# Patient Record
Sex: FEMALE | Race: WHITE | NOT HISPANIC OR LATINO | Employment: UNEMPLOYED | URBAN - METROPOLITAN AREA
[De-identification: names, ages, dates, MRNs, and addresses within clinical notes are randomized per-mention and may not be internally consistent; named-entity substitution may affect disease eponyms.]

---

## 2021-07-22 ENCOUNTER — VBI (OUTPATIENT)
Dept: ADMINISTRATIVE | Facility: OTHER | Age: 56
End: 2021-07-22

## 2021-07-22 NOTE — TELEPHONE ENCOUNTER
Upon review of the In Basket request we were able to locate, review, and update the patient chart as requested for Mammogram and Pap Smear (HPV) aka Cervical Cancer Screening  Any additional questions or concerns should be emailed to the Practice Liaisons via [email or web address] email, please do not reply via In Basket      Thank you  Ellis Barbour

## 2021-09-03 RX ORDER — GARLIC 180 MG
1 TABLET, DELAYED RELEASE (ENTERIC COATED) ORAL DAILY
COMMUNITY
End: 2021-09-07 | Stop reason: ALTCHOICE

## 2021-09-03 RX ORDER — EVENING PRIMROSE OIL 500 MG
1 CAPSULE ORAL DAILY
COMMUNITY
End: 2021-09-07 | Stop reason: ALTCHOICE

## 2021-09-06 PROBLEM — Z80.3 FAMILY HISTORY OF MALIGNANT NEOPLASM OF BREAST: Status: ACTIVE | Noted: 2021-09-06

## 2021-09-07 ENCOUNTER — ANNUAL EXAM (OUTPATIENT)
Dept: OBGYN CLINIC | Facility: CLINIC | Age: 56
End: 2021-09-07
Payer: COMMERCIAL

## 2021-09-07 VITALS
DIASTOLIC BLOOD PRESSURE: 58 MMHG | HEIGHT: 64 IN | WEIGHT: 133.6 LBS | BODY MASS INDEX: 22.81 KG/M2 | SYSTOLIC BLOOD PRESSURE: 100 MMHG

## 2021-09-07 DIAGNOSIS — Z01.419 ENCOUNTER FOR ANNUAL ROUTINE GYNECOLOGICAL EXAMINATION: Primary | ICD-10-CM

## 2021-09-07 DIAGNOSIS — Z12.31 ENCOUNTER FOR SCREENING MAMMOGRAM FOR MALIGNANT NEOPLASM OF BREAST: ICD-10-CM

## 2021-09-07 DIAGNOSIS — Z80.3 FAMILY HISTORY OF BREAST CANCER: ICD-10-CM

## 2021-09-07 PROCEDURE — 99396 PREV VISIT EST AGE 40-64: CPT | Performed by: OBSTETRICS & GYNECOLOGY

## 2021-09-07 NOTE — ASSESSMENT & PLAN NOTE
All well, no complaints  Normal breast and pelvic exams  Pap normal last year  Mammo order given  Encouraged derm evaluation of eczema and lubricants for vaginal dryness

## 2021-09-07 NOTE — PROGRESS NOTES
Assessment/Plan:    Encounter for annual routine gynecological examination  All well, no complaints  Normal breast and pelvic exams  Pap normal last year  Mammo order given  Encouraged derm evaluation of eczema and lubricants for vaginal dryness  Diagnoses and all orders for this visit:    Encounter for annual routine gynecological examination    Encounter for screening mammogram for malignant neoplasm of breast  -     Mammo screening bilateral w 3d & cad; Future    Family history of breast cancer  -     Mammo screening bilateral w 3d & cad; Future    Other orders  -     Discontinue: Evening Primrose Oil 500 MG CAPS; Take 1 capsule by mouth daily (Patient not taking: Reported on 2021)  -     Discontinue: Black Cohosh 40 MG CAPS; Take 1 capsule by mouth daily (Patient not taking: Reported on 2021)  -     Multiple Vitamins-Minerals (DAILY MULTI PO); Take 1 tablet by mouth daily          Subjective:      Patient ID: Lara Lomeli is a 61 y o  female  Here for well check  The following portions of the patient's history were reviewed and updated as appropriate:   She  has a past medical history of Colon cancer screening - with Cologard (),  3 para 3, and Papanicolaou smear (2020)  She   Patient Active Problem List    Diagnosis Date Noted    Encounter for annual routine gynecological examination 2021    Family history of malignant neoplasm of breast - Maternal aunt 2021     She  has a past surgical history that includes Mammo (historical) (Bilateral, 2020); Colonoscopy (); and Cervical polypectomy ()  Her family history includes Breast cancer in her maternal aunt  She  reports that she has never smoked  She has never used smokeless tobacco  She reports current alcohol use  She reports that she does not use drugs    Current Outpatient Medications   Medication Sig Dispense Refill    Multiple Vitamins-Minerals (DAILY MULTI PO) Take 1 tablet by mouth daily       No current facility-administered medications for this visit  She is allergic to acetaminophen, doxycycline, fish-derived products - food allergy, and sulfa antibiotics       Review of Systems  No PMB, breast, bladder, bowel changes   No new persistent pain, bloating, early satiety or pelvic pressure    Objective:      /58   Ht 5' 3 75" (1 619 m)   Wt 60 6 kg (133 lb 9 6 oz)   Breastfeeding No   BMI 23 11 kg/m²          Physical Exam  General appearance: no distress, pleasant,anxious  Neck: thyroid without nodules or thyromegaly, no palpable adenopathy  Lymph nodes: no palpable adenopathy  Breasts: no masses, nodes or skin changes  Abdomen: soft, non tender, no palpable masses  Pelvic exam: normal atrophic external genitalia, tiny right hemangioma on clitoral amaya, urethral meatus normal, vagina atrophic without lesions, cervix atrophic without lesions, uterus small, non tender, no adnexal masses, non tender  Rectal exam: normal sphincter tone, no masses, RV confirms above

## 2021-09-07 NOTE — LETTER
2021     [address]  [facility]    Patient: Annelise Rogers   YOB: 1965   Date of Visit: 2021       Dear Dr Teofilo Ortega: Thank you for referring Jorge Pagan to me for evaluation  Below are my notes for this consultation  If you have questions, please do not hesitate to call me  I look forward to following your patient along with you  Sincerely,        Marj Gutierrez MD        CC: No Recipients  Marj Gutierrez MD  2021  1:45 PM  Sign when Signing Visit  Assessment/Plan:    Encounter for annual routine gynecological examination  All well, no complaints  Normal breast and pelvic exams  Pap normal last year  Mammo order given  Encouraged derm evaluation of eczema and lubricants for vaginal dryness  Diagnoses and all orders for this visit:    Encounter for annual routine gynecological examination    Encounter for screening mammogram for malignant neoplasm of breast  -     Mammo screening bilateral w 3d & cad; Future    Family history of breast cancer  -     Mammo screening bilateral w 3d & cad; Future    Other orders  -     Discontinue: Evening Primrose Oil 500 MG CAPS; Take 1 capsule by mouth daily (Patient not taking: Reported on 2021)  -     Discontinue: Black Cohosh 40 MG CAPS; Take 1 capsule by mouth daily (Patient not taking: Reported on 2021)  -     Multiple Vitamins-Minerals (DAILY MULTI PO); Take 1 tablet by mouth daily          Subjective:      Patient ID: Annelise Rogers is a 61 y o  female  Here for well check  The following portions of the patient's history were reviewed and updated as appropriate:   She  has a past medical history of Colon cancer screening - with Cologard (),  3 para 3, and Papanicolaou smear (2020)    She   Patient Active Problem List    Diagnosis Date Noted    Encounter for annual routine gynecological examination 2021    Family history of malignant neoplasm of breast - Maternal aunt 09/06/2021     She  has a past surgical history that includes Mammo (historical) (Bilateral, 09/23/2020); Colonoscopy (2009); and Cervical polypectomy (2017)  Her family history includes Breast cancer in her maternal aunt  She  reports that she has never smoked  She has never used smokeless tobacco  She reports current alcohol use  She reports that she does not use drugs  Current Outpatient Medications   Medication Sig Dispense Refill    Multiple Vitamins-Minerals (DAILY MULTI PO) Take 1 tablet by mouth daily       No current facility-administered medications for this visit  She is allergic to acetaminophen, doxycycline, fish-derived products - food allergy, and sulfa antibiotics       Review of Systems  No PMB, breast, bladder, bowel changes   No new persistent pain, bloating, early satiety or pelvic pressure    Objective:      /58   Ht 5' 3 75" (1 619 m)   Wt 60 6 kg (133 lb 9 6 oz)   Breastfeeding No   BMI 23 11 kg/m²          Physical Exam  General appearance: no distress, pleasant,anxious  Neck: thyroid without nodules or thyromegaly, no palpable adenopathy  Lymph nodes: no palpable adenopathy  Breasts: no masses, nodes or skin changes  Abdomen: soft, non tender, no palpable masses  Pelvic exam: normal atrophic external genitalia, tiny right hemangioma on clitoral amaya, urethral meatus normal, vagina atrophic without lesions, cervix atrophic without lesions, uterus small, non tender, no adnexal masses, non tender  Rectal exam: normal sphincter tone, no masses, RV confirms above

## 2021-09-07 NOTE — PATIENT INSTRUCTIONS
Return to office in one year unless having any problems such as bleeding, new persistent pain, new progressive bloating, new problems eating (getting full to quickly) or new constant urinary pressure that does not resolve in one week      Lubricants to consider: Suzi Replens, coconut oil    Dermatologist: Dr Oj Carrillo Phone: [phone]

## 2021-09-14 ENCOUNTER — TELEPHONE (OUTPATIENT)
Dept: OBGYN CLINIC | Facility: CLINIC | Age: 56
End: 2021-09-14

## 2021-09-14 NOTE — TELEPHONE ENCOUNTER
Patient called stating she wanted to know if Dr Lo Pantoja uses something different during her [facility] last week 09/07/21 in [facility]  She develop a rash on her rear lower back and also "finger marks" that is red in the front  She denies fever, denies difficulty breathing, denies fluid lesions just itchy rash  Pt asked if we used new table paper or anything else can cause a rash/allergic reaction  Advised that nothing new is use, we use regular lubricants for exam, latex free gloves, same gown and table paper  She states she did not use coconut oil, replensh, or astroglide for lubricants (vaginal dryness) as recommended per Dr Lo Pantoja  Also she asked if it is a "[facility] thing in regards to rectal exam" as she was surprised that Dr Lo Pantoja did a rectal exam and she never had that done before  Advised pt that the providers usually asked patient first if okay to perform the rectal exam to check for problems such as an abnormal mass in the rectum or the anus, ovarian cancer, or uterine cancer typically age of 48 and older  Pt request a call back if regards to her rash in which what may be causing it  Dr Lo Pantoja please review and address

## 2021-09-15 NOTE — TELEPHONE ENCOUNTER
Attempt to call patient  Left a detailed message on pt's vm of Dr Baugh Shall message as written   Advised her to call back if she interested in the appointment for further eval

## 2021-09-15 NOTE — TELEPHONE ENCOUNTER
Rash on the back could be due to the detergent used on the gown if pt has sensitive skin  I would be happy to see her tomorrow @8AM in [facility] to look at it  For any contact rash benadryl for itching and hydrocortisone cream topically be be of use  A rectal exam is indicated after 40 to assess the space behind the uterus where ovarian problems (cysts, tumors) sit  I do inform a pt prior to that part of the exam and allow the to decline    Thanks

## 2021-09-17 NOTE — TELEPHONE ENCOUNTER
Pt called with follow up questions regarding the rash she developed to her lower abdomen and lower back post her exam  Pt questioned if the gowns are the same color used in prior exams, any change in table paper, what hand does provider place on the outside of her abdomen when doing her exam  Advised pt we use latex free gloves, same gowns and table paper  Reviewed, Dr Ale Abrams had offered to see her yesterday for an exam  Pt declined informing she was not comfortable coming back and risking developing another rash  Pt would like alernaitve   Measures such as a  (paper gown) to be used with her next [facility] appointment and will discuss when she calls to schedule  Pt denies having a rash to her perineum post exam    This nurse offered multiple times to assist with scheduling a follow up with Dr Ale Abrams, to further evaluate, pt declined, recommended a follow up with her PCP

## 2022-09-06 NOTE — PROGRESS NOTES
Assessment/Plan:    Encounter for annual routine gynecological examination  Here for well check  Had rash after last year's exam had questioned detergents used in gowns  Not used today  Normal breast exam, mammo order given, last 2020  Pelvic exam with right clitoral amaya hemangioma  Recommend topical estrogen for 6 weeks, recheck with possible biopsy if unresolved  Last pap 2020 neg/HPV neg  Cologuard , due next year  Dexa @64       Diagnoses and all orders for this visit:    Encounter for screening mammogram for malignant neoplasm of breast  -     Mammo screening bilateral w 3d & cad; Future    Encounter for annual routine gynecological examination    Vaginal atrophy  -     estradiol (ESTRACE VAGINAL) 0 1 mg/g vaginal cream; Insert 1 g into the vagina 2 (two) times a week Small external application 2-3 times per week          Subjective:      Patient ID: Bobo Brooks is a 61 y o  female  Here for well check  The following portions of the patient's history were reviewed and updated as appropriate:   She  has a past medical history of Abnormal Pap smear of cervix, Colon cancer screening - with Cologard (),  3 para 3, Kidney stone, Migraine, Papanicolaou smear (2020), and Varicella  She   Patient Active Problem List    Diagnosis Date Noted    Encounter for annual routine gynecological examination 2021    Family history of malignant neoplasm of breast - Maternal aunt 2021     She  has a past surgical history that includes Mammo (historical) (Bilateral, 2020); Colonoscopy (); Cervical polypectomy (); and Gynecologic cryosurgery  Her family history includes Breast cancer in her maternal aunt  She  reports that she has never smoked  She has never used smokeless tobacco  She reports current alcohol use of about 1 0 standard drink of alcohol per week  She reports that she does not use drugs    Current Outpatient Medications   Medication Sig Dispense Refill    [START ON 9/12/2022] estradiol (ESTRACE VAGINAL) 0 1 mg/g vaginal cream Insert 1 g into the vagina 2 (two) times a week Small external application 2-3 times per week 42 5 g 1    Multiple Vitamins-Minerals (DAILY MULTI PO) Take 1 tablet by mouth daily       No current facility-administered medications for this visit  She is allergic to acetaminophen, aspirin, contrast [iodinated diagnostic agents], dextromethorphan, doxycycline, erythromycin, fish-derived products - food allergy, sulfa antibiotics, and tussin [guaifenesin]       Review of Systems  No PMB, breast, bladder, bowel changes   No new persistent pain, bloating, early satiety or pelvic pressure      Objective:      /65 (BP Location: Left arm, Patient Position: Sitting, Cuff Size: Standard)   Ht 5' 4 5" (1 638 m)   Wt 63 1 kg (139 lb 3 2 oz)   BMI 23 52 kg/m²     Offered and declined chaperone, agreed to full exam including rectal       Physical Exam    General appearance: no distress, pleasant,anxious  Neck: thyroid without nodules or thyromegaly, no palpable adenopathy  Lymph nodes: no palpable adenopathy  Breasts: no masses, nodes or skin changes  Abdomen: soft, non tender, no palpable masses  Pelvic exam: normal atrophic external genitalia, tiny right hemangioma on clitoral amaya, urethral meatus normal, vagina atrophic without lesions, cervix atrophic without lesions, uterus small, non tender, no adnexal masses, non tender  Rectal exam: normal sphincter tone, no masses, RV confirms above

## 2022-09-09 ENCOUNTER — ANNUAL EXAM (OUTPATIENT)
Dept: OBGYN CLINIC | Facility: CLINIC | Age: 57
End: 2022-09-09

## 2022-09-09 VITALS
WEIGHT: 139.2 LBS | SYSTOLIC BLOOD PRESSURE: 105 MMHG | BODY MASS INDEX: 23.19 KG/M2 | DIASTOLIC BLOOD PRESSURE: 65 MMHG | HEIGHT: 65 IN

## 2022-09-09 DIAGNOSIS — Z12.31 ENCOUNTER FOR SCREENING MAMMOGRAM FOR MALIGNANT NEOPLASM OF BREAST: ICD-10-CM

## 2022-09-09 DIAGNOSIS — Z01.419 ENCOUNTER FOR ANNUAL ROUTINE GYNECOLOGICAL EXAMINATION: Primary | ICD-10-CM

## 2022-09-09 DIAGNOSIS — N95.2 VAGINAL ATROPHY: ICD-10-CM

## 2022-09-09 RX ORDER — ESTRADIOL 0.1 MG/G
1 CREAM VAGINAL 2 TIMES WEEKLY
Qty: 42.5 G | Refills: 1 | Status: SHIPPED | OUTPATIENT
Start: 2022-09-12

## 2022-09-09 NOTE — LETTER
2022     [address]  [facility]    Patient: Fabio Montaño   YOB: 1965   Date of Visit: 2022       Dear Dr Yuri Pugh: Thank you for referring Bhavani Hammond to me for evaluation  Below are my notes for this consultation  If you have questions, please do not hesitate to call me  I look forward to following your patient along with you  Sincerely,        Ozzy Savage MD        CC: No Recipients  Ozzy Savage MD  2022 10:18 AM  Sign when Signing Visit  Assessment/Plan:    Encounter for annual routine gynecological examination  Here for well check  Had rash after last year's exam had questioned detergents used in gowns  Not used today  Normal breast exam, mammo order given, last 2020  Pelvic exam with right clitoral amaya hemangioma  Recommend topical estrogen for 6 weeks, recheck with possible biopsy if unresolved  Last pap 2020 neg/HPV neg  Cologuard , due next year  Dexa @64       Diagnoses and all orders for this visit:    Encounter for screening mammogram for malignant neoplasm of breast  -     Mammo screening bilateral w 3d & cad; Future    Encounter for annual routine gynecological examination    Vaginal atrophy  -     estradiol (ESTRACE VAGINAL) 0 1 mg/g vaginal cream; Insert 1 g into the vagina 2 (two) times a week Small external application 2-3 times per week          Subjective:      Patient ID: Fabio Montaño is a 61 y o  female  Here for well check  The following portions of the patient's history were reviewed and updated as appropriate:   She  has a past medical history of Abnormal Pap smear of cervix, Colon cancer screening - with Cologard (),  3 para 3, Kidney stone, Migraine, Papanicolaou smear (2020), and Varicella    She   Patient Active Problem List    Diagnosis Date Noted    Encounter for annual routine gynecological examination 2021    Family history of malignant neoplasm of breast - Maternal aunt 09/06/2021     She  has a past surgical history that includes Mammo (historical) (Bilateral, 09/23/2020); Colonoscopy (2009); Cervical polypectomy (2017); and Gynecologic cryosurgery  Her family history includes Breast cancer in her maternal aunt  She  reports that she has never smoked  She has never used smokeless tobacco  She reports current alcohol use of about 1 0 standard drink of alcohol per week  She reports that she does not use drugs  Current Outpatient Medications   Medication Sig Dispense Refill    [START ON 9/12/2022] estradiol (ESTRACE VAGINAL) 0 1 mg/g vaginal cream Insert 1 g into the vagina 2 (two) times a week Small external application 2-3 times per week 42 5 g 1    Multiple Vitamins-Minerals (DAILY MULTI PO) Take 1 tablet by mouth daily       No current facility-administered medications for this visit  She is allergic to acetaminophen, aspirin, contrast [iodinated diagnostic agents], dextromethorphan, doxycycline, erythromycin, fish-derived products - food allergy, sulfa antibiotics, and tussin [guaifenesin]       Review of Systems  No PMB, breast, bladder, bowel changes   No new persistent pain, bloating, early satiety or pelvic pressure      Objective:      /65 (BP Location: Left arm, Patient Position: Sitting, Cuff Size: Standard)   Ht 5' 4 5" (1 638 m)   Wt 63 1 kg (139 lb 3 2 oz)   BMI 23 52 kg/m²     Offered and declined chaperone, agreed to full exam including rectal       Physical Exam    General appearance: no distress, pleasant,anxious  Neck: thyroid without nodules or thyromegaly, no palpable adenopathy  Lymph nodes: no palpable adenopathy  Breasts: no masses, nodes or skin changes  Abdomen: soft, non tender, no palpable masses  Pelvic exam: normal atrophic external genitalia, tiny right hemangioma on clitoral amaya, urethral meatus normal, vagina atrophic without lesions, cervix atrophic without lesions, uterus small, non tender, no adnexal masses, non tender  Rectal exam: normal sphincter tone, no masses, RV confirms above

## 2022-09-09 NOTE — ASSESSMENT & PLAN NOTE
Here for well check  Had rash after last year's exam had questioned detergents used in gowns  Not used today  Normal breast exam, mammo order given, last 9/2020  Pelvic exam with right clitoral amaya hemangioma  Recommend topical estrogen for 6 weeks, recheck with possible biopsy if unresolved    Last pap 9/2020 neg/HPV neg  Cologuard 2020, due next year  Dexa @64

## 2022-09-09 NOTE — PATIENT INSTRUCTIONS
Return to office in one year unless having any problems such as breast changes, bleeding, new persistent pain, new progressive bloating, new problems eating (getting full to quickly) or new constant urinary pressure that does not resolve in one week  Call in six months to schedule your annual visit  Look on [employer] for a coupon for the estradiol cream and show to your pharmacy

## 2022-11-12 NOTE — PROGRESS NOTES
Assessment/Plan:    Vulvar lesion  Here for recheck of clitoral amaya hemangioma/lesion  Appearance more keratotic today after estrogen use  Biopsy with ease, will contact with results  Diagnoses and all orders for this visit:    Vulvar lesion  -     Biopsy  -     Histology Specimen          Subjective:      Patient ID: Avelino Rodriguez is a 61 y o  female  HPI Here for pelvic recheck    The following portions of the patient's history were reviewed and updated as appropriate:   She  has a past medical history of Abnormal Pap smear of cervix, Colon cancer screening - with Cologard (),  3 para 3, Kidney stone, Migraine, Papanicolaou smear (2020), and Varicella  She   Patient Active Problem List    Diagnosis Date Noted   • Vulvar lesion 11/15/2022   • Encounter for annual routine gynecological examination 2021   • Family history of malignant neoplasm of breast - Maternal aunt 2021     She  has a past surgical history that includes Mammo (historical) (Bilateral, 2020); Colonoscopy (); Cervical polypectomy (); and Gynecologic cryosurgery  Her family history includes Breast cancer in her maternal aunt  She  reports that she has never smoked  She has never used smokeless tobacco  She reports current alcohol use of about 1 0 standard drink of alcohol per week  She reports that she does not use drugs  Current Outpatient Medications   Medication Sig Dispense Refill   • Multiple Vitamins-Minerals (DAILY MULTI PO) Take 1 tablet by mouth daily       No current facility-administered medications for this visit  She is allergic to acetaminophen, aspirin, contrast [iodinated diagnostic agents], dextromethorphan, doxycycline, erythromycin, fish-derived products - food allergy, sulfa antibiotics, and tussin [guaifenesin]       Review of Systems      Objective:      BP 98/60   Ht 5' 3 5" (1 613 m)   Wt 63 kg (138 lb 12 8 oz)   Breastfeeding No   BMI 24 20 kg/m² Physical Exam    Appears well, no apparent distress  Does not appear anxious or depressed  Pelvic: normal atrophic external genitalia, tiny right raised lesion/hemangioma on clitoral amaya    Biopsy    Date/Time: 11/15/2022 3:46 PM  Performed by: Mario Jose MD  Authorized by: Mario Jose MD   Universal Protocol:  Consent: Verbal consent obtained  Consent given by: patient  Patient understanding: patient states understanding of the procedure being performed  Patient identity confirmed: verbally with patient      Procedure Details - Lesion Biopsy: Body area: Anogenital    Anogenital location:  Vulva    Vaginal Lesion: No      Biopsy method: shave biopsy      Biopsy tissue type: skin    Initial size (mm):  3    Final defect size (mm):  3     Plain lidocaine infiltrated clitoral amaya after skin prep  Lesion elevated and shaved off with ease  Silver nitrate for hemostasis  Tolerated well

## 2022-11-14 ENCOUNTER — TELEPHONE (OUTPATIENT)
Dept: OBGYN CLINIC | Facility: CLINIC | Age: 57
End: 2022-11-14

## 2022-11-14 NOTE — TELEPHONE ENCOUNTER
Claudetta Repine called with concerns about what type of anesthesia would be used tomorrow  She has had a problem with Novocain for dental work in past   Reports palpitaitons or feeling shakey  Advised SOG uses xylocaine if indicated  Explained palpitations are known reaction to anesthetics containing epinephrine which we do not use  Recommended discuss concerns with Dr Margarita Hinojosa at time of visit tomorrow   Patient in agreement, Dr Elam Malling, Caryle Shove

## 2022-11-15 ENCOUNTER — OFFICE VISIT (OUTPATIENT)
Dept: OBGYN CLINIC | Facility: CLINIC | Age: 57
End: 2022-11-15

## 2022-11-15 ENCOUNTER — TELEPHONE (OUTPATIENT)
Dept: OBGYN CLINIC | Facility: CLINIC | Age: 57
End: 2022-11-15

## 2022-11-15 VITALS
SYSTOLIC BLOOD PRESSURE: 98 MMHG | HEIGHT: 64 IN | BODY MASS INDEX: 23.7 KG/M2 | WEIGHT: 138.8 LBS | DIASTOLIC BLOOD PRESSURE: 60 MMHG

## 2022-11-15 DIAGNOSIS — N90.89 VULVAR LESION: Primary | ICD-10-CM

## 2022-11-15 NOTE — ASSESSMENT & PLAN NOTE
Here for recheck of clitoral amaya hemangioma/lesion  Appearance more keratotic today after estrogen use  Biopsy with ease, will contact with results

## 2022-11-15 NOTE — PATIENT INSTRUCTIONS
Please call the office if you do not hear about your results in 10-14 days  Daily showering is fine  Call with any concerns, use Aquafor ointment to the area for a few days

## 2022-11-15 NOTE — TELEPHONE ENCOUNTER
Patient called on-call line with concern of bleeding after biopsy in office today  Office notes from Dr Perry Apo reviewed -- small shave biopsy of clitoral amaya performed in office today  No complications  Patient reports that she noticed some brown-red spotting on gauze after walking  Patient reassured  We reviewed that she should expect light bleeding/staining for several days after biopsy  Recommend keeping wound clean and dry  Cover with pad or gauze; may use triple antibiotic ointment as barrier against friction  Patient appreciative of the call       Jair Poole MD  11/15/2022 7:01 PM

## 2022-11-23 LAB
PATH REPORT.COMMENTS IMP SPEC: NORMAL
PATH REPORT.SITE OF ORIGIN SPEC: NORMAL
PAYMENT PROCEDURE: NORMAL
SL AMB .: NORMAL

## 2022-11-25 ENCOUNTER — TELEPHONE (OUTPATIENT)
Dept: OBGYN CLINIC | Facility: CLINIC | Age: 57
End: 2022-11-25

## 2022-12-01 ENCOUNTER — TELEPHONE (OUTPATIENT)
Dept: OBGYN CLINIC | Facility: CLINIC | Age: 57
End: 2022-12-01

## 2023-09-22 ENCOUNTER — TELEPHONE (OUTPATIENT)
Dept: OBGYN CLINIC | Facility: CLINIC | Age: 58
End: 2023-09-22

## 2023-09-22 DIAGNOSIS — Z12.31 ENCOUNTER FOR SCREENING MAMMOGRAM FOR MALIGNANT NEOPLASM OF BREAST: Primary | ICD-10-CM

## 2023-09-22 NOTE — TELEPHONE ENCOUNTER
Pt called requesting screening mammogram order to [facility]. Order faxed per pt request and advised if fax not successful she will need to come into the office to  order.

## 2023-10-10 PROBLEM — N90.89 VULVAR LESION: Status: RESOLVED | Noted: 2022-11-15 | Resolved: 2023-10-10

## 2023-10-10 NOTE — PROGRESS NOTES
Assessment/Plan:    Encounter for annual routine gynecological examination  Here for well check, no breast or gyn complaints. Normal breast and pelvic exams. Last pap 2020 neg/HPV neg; repeated today  Mammo order given, last 3 years ago per pt, agrees to schedule. Cologuard neg 2020, will have with PCP. Dexa @65, no risk factors. Calcium recs reviewed       Diagnoses and all orders for this visit:    Family history of malignant neoplasm of breast - Maternal aunt  -     Mammo screening bilateral w 3d & cad; Future    Encounter for annual routine gynecological examination    Breast cancer screening by mammogram  -     Mammo screening bilateral w 3d & cad; Future    Screening for malignant neoplasm of the cervix  -     IGP, Aptima HPV, Rfx 16/18,45          Subjective:      Patient ID: Rachana Solorzano is a 60 y.o. female. HPI Here for well check. The following portions of the patient's history were reviewed and updated as appropriate:   She  has a past medical history of Abnormal Pap smear of cervix, Colon cancer screening - with Cologard (),  3 para 3, Kidney stone, Migraine, Papanicolaou smear (2020), and Varicella. She   Patient Active Problem List    Diagnosis Date Noted    Encounter for annual routine gynecological examination 2021    Family history of malignant neoplasm of breast - Maternal aunt 2021     She  has a past surgical history that includes Mammo (historical) (Bilateral, 2020); Colonoscopy (); Cervical polypectomy (); and Gynecologic cryosurgery. Her family history includes Breast cancer in her maternal aunt; Hyperlipidemia in her mother; No Known Problems in her daughter, maternal grandfather, maternal grandmother, paternal grandfather, paternal grandmother, and son; Ovarian cysts in her sister; Stroke in her father; Testicular cancer in her son. She  reports that she has never smoked.  She has never used smokeless tobacco. She reports current alcohol use of about 1.0 standard drink of alcohol per week. She reports that she does not use drugs. Current Outpatient Medications   Medication Sig Dispense Refill    Multiple Vitamins-Minerals (DAILY MULTI PO) Take 1 tablet by mouth daily       No current facility-administered medications for this visit. She is allergic to acetaminophen, aspirin, contrast [iodinated contrast media], dextromethorphan, doxycycline, erythromycin, fish-derived products - food allergy, sulfa antibiotics, and tussin [guaifenesin]. .    Review of Systems  No PMB, breast, bladder, bowel changes.  No new persistent pain, bloating, early satiety or pelvic pressure      Objective:      /60 (BP Location: Left arm, Patient Position: Sitting, Cuff Size: Standard)   Ht 5' 3.5" (1.613 m)   Wt 61.2 kg (135 lb)   BMI 23.54 kg/m²          Physical Exam    General appearance: no distress, pleasant  Neck: thyroid without nodules or thyromegaly, no palpable adenopathy  Lymph nodes: no palpable adenopathy  Breasts: no masses, nodes or skin changes  Abdomen: soft, non tender, no palpable masses  Pelvic exam: normal atrophic external genitalia, urethral meatus normal, vagina atrophic without lesions, cervix atrophic without lesions, uterus small, non tender, no adnexal masses, non tender  Rectal exam: normal sphincter tone, no masses, RV confirms above

## 2023-10-13 ENCOUNTER — ANNUAL EXAM (OUTPATIENT)
Dept: OBGYN CLINIC | Facility: CLINIC | Age: 58
End: 2023-10-13
Payer: COMMERCIAL

## 2023-10-13 VITALS
WEIGHT: 135 LBS | BODY MASS INDEX: 23.05 KG/M2 | HEIGHT: 64 IN | DIASTOLIC BLOOD PRESSURE: 60 MMHG | SYSTOLIC BLOOD PRESSURE: 100 MMHG

## 2023-10-13 DIAGNOSIS — Z01.419 ENCOUNTER FOR ANNUAL ROUTINE GYNECOLOGICAL EXAMINATION: Primary | ICD-10-CM

## 2023-10-13 DIAGNOSIS — Z80.3 FAMILY HISTORY OF MALIGNANT NEOPLASM OF BREAST: ICD-10-CM

## 2023-10-13 DIAGNOSIS — Z12.31 BREAST CANCER SCREENING BY MAMMOGRAM: ICD-10-CM

## 2023-10-13 DIAGNOSIS — Z12.4 SCREENING FOR MALIGNANT NEOPLASM OF THE CERVIX: ICD-10-CM

## 2023-10-13 PROCEDURE — S0612 ANNUAL GYNECOLOGICAL EXAMINA: HCPCS | Performed by: OBSTETRICS & GYNECOLOGY

## 2023-10-13 NOTE — ASSESSMENT & PLAN NOTE
Here for well check, no breast or gyn complaints. Normal breast and pelvic exams. Last pap 9/2020 neg/HPV neg; repeated today  Mammo order given, last 3 years ago per pt, agrees to schedule. Cologuard neg 11/2020, will have with PCP. Dexa @65, no risk factors.  Calcium recs reviewed

## 2023-10-13 NOTE — LETTER
2023     Malorie Goss, [address] [facility] [place] [address] [facility]    Patient: Eliu Valenzuela   YOB: 1965   Date of Visit: 10/13/2023       Dear Dr. Isela Robb:    Sathya Montague was in today for her annual gyn exam. Below are my notes for this consultation. If you have questions, please do not hesitate to call me. I look forward to following your patient along with you. Sincerely,        Vinh Boateng MD        CC: No Recipients    Vinh Boateng MD  10/13/2023  1:49 PM  Sign when Signing Visit  Assessment/Plan:    Encounter for annual routine gynecological examination  Here for well check, no breast or gyn complaints. Normal breast and pelvic exams. Last pap 2020 neg/HPV neg; repeated today  Mammo order given, last 3 years ago per pt, agrees to schedule. Cologuard neg 2020, will have with PCP. Dexa @65, no risk factors. Calcium recs reviewed       Diagnoses and all orders for this visit:    Family history of malignant neoplasm of breast - Maternal aunt  -     Mammo screening bilateral w 3d & cad; Future    Encounter for annual routine gynecological examination    Breast cancer screening by mammogram  -     Mammo screening bilateral w 3d & cad; Future    Screening for malignant neoplasm of the cervix  -     IGP, Aptima HPV, Rfx 16/18,45          Subjective:      Patient ID: Yasofia Bowling is a 60 y.o. female. HPI Here for well check. The following portions of the patient's history were reviewed and updated as appropriate:   She  has a past medical history of Abnormal Pap smear of cervix, Colon cancer screening - with Cologard (),  3 para 3, Kidney stone, Migraine, Papanicolaou smear (2020), and Varicella.   She   Patient Active Problem List    Diagnosis Date Noted   • Encounter for annual routine gynecological examination 2021   • Family history of malignant neoplasm of breast - Maternal aunt 2021     She  has a past surgical history that includes Mammo (historical) (Bilateral, 09/23/2020); Colonoscopy (2009); Cervical polypectomy (2017); and Gynecologic cryosurgery. Her family history includes Breast cancer in her maternal aunt; Hyperlipidemia in her mother; No Known Problems in her daughter, maternal grandfather, maternal grandmother, paternal grandfather, paternal grandmother, and son; Ovarian cysts in her sister; Stroke in her father; Testicular cancer in her son. She  reports that she has never smoked. She has never used smokeless tobacco. She reports current alcohol use of about 1.0 standard drink of alcohol per week. She reports that she does not use drugs. Current Outpatient Medications   Medication Sig Dispense Refill   • Multiple Vitamins-Minerals (DAILY MULTI PO) Take 1 tablet by mouth daily       No current facility-administered medications for this visit. She is allergic to acetaminophen, aspirin, contrast [iodinated contrast media], dextromethorphan, doxycycline, erythromycin, fish-derived products - food allergy, sulfa antibiotics, and tussin [guaifenesin]. .    Review of Systems  No PMB, breast, bladder, bowel changes.  No new persistent pain, bloating, early satiety or pelvic pressure      Objective:      /60 (BP Location: Left arm, Patient Position: Sitting, Cuff Size: Standard)   Ht 5' 3.5" (1.613 m)   Wt 61.2 kg (135 lb)   BMI 23.54 kg/m²          Physical Exam    General appearance: no distress, pleasant  Neck: thyroid without nodules or thyromegaly, no palpable adenopathy  Lymph nodes: no palpable adenopathy  Breasts: no masses, nodes or skin changes  Abdomen: soft, non tender, no palpable masses  Pelvic exam: normal atrophic external genitalia, urethral meatus normal, vagina atrophic without lesions, cervix atrophic without lesions, uterus small, non tender, no adnexal masses, non tender  Rectal exam: normal sphincter tone, no masses, RV confirms above

## 2023-10-19 LAB
CYTOLOGIST CVX/VAG CYTO: NORMAL
DX ICD CODE: NORMAL
HPV GENOTYPE REFLEX: NORMAL
HPV I/H RISK 4 DNA CVX QL PROBE+SIG AMP: NEGATIVE
OTHER STN SPEC: NORMAL
PATH REPORT.FINAL DX SPEC: NORMAL
SL AMB NOTE:: NORMAL
SL AMB SPECIMEN ADEQUACY: NORMAL
SL AMB TEST METHODOLOGY: NORMAL

## 2023-10-31 ENCOUNTER — TELEPHONE (OUTPATIENT)
Dept: OBGYN CLINIC | Facility: CLINIC | Age: 58
End: 2023-10-31

## 2023-10-31 NOTE — TELEPHONE ENCOUNTER
Cesar Rodriguez inquiring about Pap & Mammogram results. Cesar Rodriguez did review Pap results on [facility]. Informed Cesar Rodriguez of benign Pap Smear & Mammogram results.

## 2023-11-15 DIAGNOSIS — Z12.31 BREAST CANCER SCREENING BY MAMMOGRAM: ICD-10-CM

## 2023-11-15 DIAGNOSIS — Z80.3 FAMILY HISTORY OF MALIGNANT NEOPLASM OF BREAST: ICD-10-CM

## 2024-02-21 PROBLEM — Z01.419 ENCOUNTER FOR ANNUAL ROUTINE GYNECOLOGICAL EXAMINATION: Status: RESOLVED | Noted: 2021-09-07 | Resolved: 2024-02-21

## 2024-10-16 ENCOUNTER — ANNUAL EXAM (OUTPATIENT)
Dept: OBGYN CLINIC | Facility: CLINIC | Age: 59
End: 2024-10-16
Payer: COMMERCIAL

## 2024-10-16 VITALS
DIASTOLIC BLOOD PRESSURE: 70 MMHG | WEIGHT: 130 LBS | BODY MASS INDEX: 22.2 KG/M2 | SYSTOLIC BLOOD PRESSURE: 114 MMHG | HEIGHT: 64 IN

## 2024-10-16 DIAGNOSIS — Z12.31 ENCOUNTER FOR SCREENING MAMMOGRAM FOR MALIGNANT NEOPLASM OF BREAST: ICD-10-CM

## 2024-10-16 DIAGNOSIS — Z01.419 ENCNTR FOR GYN EXAM (GENERAL) (ROUTINE) W/O ABN FINDINGS: Primary | ICD-10-CM

## 2024-10-16 PROCEDURE — S0612 ANNUAL GYNECOLOGICAL EXAMINA: HCPCS | Performed by: OBSTETRICS & GYNECOLOGY

## 2024-10-16 NOTE — PROGRESS NOTES
Annual Wellness Visit  [facility] - [address], [address], [place] [address]    ASSESSMENT/PLAN: Riya Valenzuela is a 59 y.o.  who presents for annual gynecologic exam.    Encounter for routine gynecologic examination  - Routine well woman exam completed today.  - Cervical Cancer Screening: Current ASCCP Guidelines reviewed. Last Pap: 10/13/2023. Past abnormal pap: none.  Next Pap Due: 5 yrs.  - Contraceptive counseling discussed.  Current contraception: no method, postmenopausal  - Breast Cancer Screening: Last Mammogram 10/16/2023, order provided  - The following were reviewed in today's visit: breast self exam, mammography screening ordered, adequate intake of calcium and vitamin D, exercise, and healthy diet    Additional problems addressed during this visit:  1. Encntr for gyn exam (general) (routine) w/o abn findings  2. Encounter for screening mammogram for malignant neoplasm of breast  -     Mammo screening bilateral w 3d and cad; Future      Next visit: 1 yr [place]      CC:  Annual Gynecologic Examination    HPI: Riya Valenzuela is a 59 y.o.  who presents for annual gynecologic examination.  Patient presents to Gyn wellness exam.        Gyn History  No LMP recorded. Patient is postmenopausal.     Last Pap: 10/13/2023 was normal    She  reports that she is not currently sexually active and has had partner(s) who are male. She reports using the following method of birth control/protection: Post-menopausal.       OB History      Past Medical History:  No date: Abnormal Pap smear of cervix  2020: Colon cancer screening - with Cologard      Comment:  Repeat   No date:  3 para 3  No date: Kidney stone  No date: Migraine  2020: Papanicolaou smear  No date: Varicella     Past Surgical History:  2017: CERVICAL POLYPECTOMY  2009: COLONOSCOPY  No date: GYNECOLOGIC CRYOSURGERY  2020: MAMMO (HISTORICAL); Bilateral      Comment:  [facility] BI-RADS 2. Benign  "findings.  Heterogeneously dense;                50% to 75% glandular dense breast tissue     Family History   Problem Relation Age of Onset    Hyperlipidemia Mother     Stroke Father     Ovarian cysts Sister     No Known Problems Daughter     Testicular cancer Son     No Known Problems Son     No Known Problems Maternal Grandmother     No Known Problems Maternal Grandfather     No Known Problems Paternal Grandmother     No Known Problems Paternal Grandfather     Breast cancer Maternal Aunt     Rashes / Skin problems Sister         eczema    Uterine cancer Neg Hx     Ovarian cancer Neg Hx     Colon cancer Neg Hx         Social History     Tobacco Use    Smoking status: Never    Smokeless tobacco: Never   Vaping Use    Vaping status: Never Used   Substance Use Topics    Alcohol use: Yes     Alcohol/week: 1.0 standard drink of alcohol     Types: 1 Glasses of wine per week     Comment: socially    Drug use: Never     Comment: no use          Current Outpatient Medications:     Multiple Vitamins-Minerals (DAILY MULTI PO), Take 1 tablet by mouth daily, Disp: , Rfl:     She is allergic to acetaminophen, aspirin, contrast [iodinated contrast media], dextromethorphan, doxycycline, erythromycin, fish-derived products - food allergy, sulfa antibiotics, and tussin [guaifenesin]..    ROS negative except as noted in HPI    Objective:  /70 (BP Location: Right arm, Patient Position: Sitting, Cuff Size: Standard)   Ht 5' 3.5\" (1.613 m)   Wt 59 kg (130 lb)   BMI 22.67 kg/m²      Physical Exam  Vitals and nursing note reviewed.   HENT:      Head: Normocephalic.   Chest:   Breasts:     Breasts are symmetrical.      Right: Normal. No bleeding, mass, nipple discharge, skin change or tenderness.      Left: Normal. No bleeding, mass, nipple discharge, skin change or tenderness.   Abdominal:      General: There is no distension.      Palpations: Abdomen is soft. There is no mass.      Tenderness: There is no abdominal tenderness. " There is no rebound.   Genitourinary:     General: Normal vulva.      Exam position: Lithotomy position.      Labia:         Right: No rash, tenderness or lesion.         Left: No rash, tenderness or lesion.       Urethra: No urethral pain or urethral lesion.      Vagina: Normal. No vaginal discharge.      Cervix: No discharge, friability, lesion or erythema.      Uterus: Normal.       Adnexa: Right adnexa normal and left adnexa normal.      Rectum: No external hemorrhoid.   Musculoskeletal:      Right lower leg: No edema.      Left lower leg: No edema.   Lymphadenopathy:      Upper Body:      Right upper body: No axillary or pectoral adenopathy.      Left upper body: No axillary or pectoral adenopathy.   Skin:     General: Skin is warm.   Neurological:      Mental Status: She is alert and oriented to person, place, and time.   Psychiatric:         Mood and Affect: Mood normal.         Behavior: Behavior normal.         Thought Content: Thought content normal.

## 2024-10-31 ENCOUNTER — TELEPHONE (OUTPATIENT)
Age: 59
End: 2024-10-31

## 2024-10-31 NOTE — TELEPHONE ENCOUNTER
Patient calling in stating that she had a pap smear completed at her last visit on 10/16/24, and patient was notified that there are no current results from a recent pap completed. Pt stating that she hasn't had a pap smear done last year and had it done on her most recent appt. Please clarify

## 2024-11-01 NOTE — TELEPHONE ENCOUNTER
Please inform patient she had a Pap done October 2023 and Pap smear was not indicated for 2024, therefore Pap smear was not done 2024

## 2024-11-04 NOTE — TELEPHONE ENCOUNTER
"Incoming call from patient. Reviewed information again that pap was not performed due to being performed in 2023.     Patient would like to know what was performed to her during her office visit. Sates that she had speculum in vagina and white pipette was inserted and \"spent an amount of time in there\". Would like to know what this was - if not a pap.     Routing to provider for review.       "

## 2024-11-04 NOTE — TELEPHONE ENCOUNTER
"Speculum was inserted in vagina to visualize vagina and cervix.  Uncertain what \"white pipette\" patient is referring to but there is no \"white pipette\" I am aware of.    "